# Patient Record
Sex: MALE | Race: WHITE | Employment: UNEMPLOYED | URBAN - NONMETROPOLITAN AREA
[De-identification: names, ages, dates, MRNs, and addresses within clinical notes are randomized per-mention and may not be internally consistent; named-entity substitution may affect disease eponyms.]

---

## 2021-02-17 RX ORDER — DEXAMETHASONE SODIUM PHOSPHATE 4 MG/ML
4 INJECTION, SOLUTION INTRA-ARTICULAR; INTRALESIONAL; INTRAMUSCULAR; INTRAVENOUS; SOFT TISSUE ONCE
Status: CANCELLED | OUTPATIENT
Start: 2021-02-17 | End: 2021-02-17

## 2021-02-17 RX ORDER — BUPIVACAINE HYDROCHLORIDE 2.5 MG/ML
5 INJECTION, SOLUTION EPIDURAL; INFILTRATION; INTRACAUDAL ONCE
Status: CANCELLED | OUTPATIENT
Start: 2021-02-17 | End: 2021-02-17

## 2021-02-18 ENCOUNTER — HOSPITAL ENCOUNTER (OUTPATIENT)
Dept: INTERVENTIONAL RADIOLOGY/VASCULAR | Age: 21
Discharge: HOME | End: 2021-02-18

## 2021-02-18 VITALS
SYSTOLIC BLOOD PRESSURE: 117 MMHG | RESPIRATION RATE: 18 BRPM | HEART RATE: 80 BPM | TEMPERATURE: 97.8 F | OXYGEN SATURATION: 98 % | DIASTOLIC BLOOD PRESSURE: 66 MMHG

## 2021-02-18 DIAGNOSIS — R52 PAIN: ICD-10-CM

## 2021-02-18 PROCEDURE — 6360000002 HC RX W HCPCS

## 2021-02-18 PROCEDURE — 62323 NJX INTERLAMINAR LMBR/SAC: CPT | Performed by: RADIOLOGY

## 2021-02-18 PROCEDURE — 2709999900 HC NON-CHARGEABLE SUPPLY

## 2021-02-18 PROCEDURE — 2500000003 HC RX 250 WO HCPCS

## 2021-02-18 PROCEDURE — 6360000004 HC RX CONTRAST MEDICATION: Performed by: RADIOLOGY

## 2021-02-18 RX ORDER — BUPIVACAINE HYDROCHLORIDE 2.5 MG/ML
5 INJECTION, SOLUTION EPIDURAL; INFILTRATION; INTRACAUDAL ONCE
Status: COMPLETED | OUTPATIENT
Start: 2021-02-18 | End: 2021-02-18

## 2021-02-18 RX ORDER — DEXAMETHASONE SODIUM PHOSPHATE 4 MG/ML
4 INJECTION, SOLUTION INTRA-ARTICULAR; INTRALESIONAL; INTRAMUSCULAR; INTRAVENOUS; SOFT TISSUE ONCE
Status: COMPLETED | OUTPATIENT
Start: 2021-02-18 | End: 2021-02-18

## 2021-02-18 RX ORDER — GABAPENTIN 300 MG/1
300 CAPSULE ORAL 3 TIMES DAILY
COMMUNITY

## 2021-02-18 RX ADMIN — IOHEXOL 2 ML: 180 INJECTION INTRAVENOUS at 11:12

## 2021-02-18 RX ADMIN — BUPIVACAINE HYDROCHLORIDE 2 ML: 2.5 INJECTION, SOLUTION EPIDURAL; INFILTRATION; INTRACAUDAL at 11:12

## 2021-02-18 RX ADMIN — DEXAMETHASONE SODIUM PHOSPHATE 4 MG: 4 INJECTION, SOLUTION INTRA-ARTICULAR; INTRALESIONAL; INTRAMUSCULAR; INTRAVENOUS; SOFT TISSUE at 11:12

## 2021-02-18 ASSESSMENT — PAIN SCALES - GENERAL
PAINLEVEL_OUTOF10: 8
PAINLEVEL_OUTOF10: 8
PAINLEVEL_OUTOF10: 3
PAINLEVEL_OUTOF10: 5

## 2021-02-18 ASSESSMENT — PAIN DESCRIPTION - LOCATION
LOCATION: BACK
LOCATION: BACK

## 2021-02-18 ASSESSMENT — PAIN DESCRIPTION - PAIN TYPE: TYPE: CHRONIC PAIN

## 2021-02-18 NOTE — OP NOTE
Department of Radiology  Post Procedure Progress Note    Pre-Procedure Diagnosis:  Lumbar radiculopathy     Procedure Performed:  Epidural block/steroid injection      Anesthesia: local     Findings: successful    Immediate Complications:  None    Estimated Blood Loss: minimal    SEE DICTATED PROCEDURE NOTE FOR COMPLETE DETAILS.       Terra Worthy MD   2/18/2021 11:14 AM

## 2021-02-18 NOTE — PROGRESS NOTES
1038 pt arrives ambulatory with friend for nerve block. Procedure explained and questions answered. PT RIGHTS AND RESPONSIBILITIES OFFERED TO PT. Pedal push and pull and hand grasp equal and strong bilaterally. Pt has pain 8/10 in lower back radiating to both legs. Pt has occasional numbness in BLE. 1055 pt to specials via bed.   1124 pt back from specials. Vitals stable. bandaid clean, dry and intact. Pt denies new numbness or pain. Pt provided with water. Brother at bedside. 1145 vitals stable. Pt denies new numbness or pain. Pt states pain is better. bandaid clean, dry and intact. Pt tolerating water. Pt discharged ambulatory with brother with instructions with no complaints.                __m__ Safety:       (Environmental)   [place] to environment   Ensure ID band is correct and in place/ allergy band as needed   Assess for fall risk   Initiate fall precautions as applicable (fall band, side rails, etc.)   Call light within reach   Bed in low position/ wheels locked    __m__ Pain:        Assess pain level and characteristics   Administer analgesics as ordered   Assess effectiveness of pain management and report to MD as needed    _m___ Knowledge Deficit:   Assess baseline knowledge   Provide teaching at level of understanding   Provide teaching via preferred learning method   Evaluate teaching effectiveness    __m__ Hemodynamic/Respiratory Status:       (Pre and Post Procedure Monitoring)   Assess/Monitor vital signs and LOC   Assess Baseline SpO2 prior to any sedation   Obtain weight/height   Assess vital signs/ LOC until patient meets discharge criteria   Monitor procedure site and notify MD of any issues

## 2021-02-18 NOTE — H&P
Formulation and discussion of sedation / procedure plans, risks, benefits, side effects and alternatives with patient and/or responsible adult completed.     Electronically signed by Titus Montemayor MD on 2/18/2021 at 11:14 AM

## 2021-02-18 NOTE — PROGRESS NOTES
1100  Patient received in IR for lumbar epidural. Back pain 8/10. bilat feet goes numb. 1106 This procedure has been fully reviewed with the patient and written informed consent has been obtained. [id number] Procedure started with Dr. Calli Elizabeth  1113 Procedure completed; patient tolerated well. Band aid to back; no bleeding noted. 1114 Patient on cart; comfort ensured. Pedal push and pull strong and equal.   1116 Patient taken to OPN via cart.